# Patient Record
Sex: FEMALE | Race: BLACK OR AFRICAN AMERICAN | URBAN - METROPOLITAN AREA
[De-identification: names, ages, dates, MRNs, and addresses within clinical notes are randomized per-mention and may not be internally consistent; named-entity substitution may affect disease eponyms.]

---

## 2022-09-22 ENCOUNTER — WEB ENCOUNTER (OUTPATIENT)
Dept: URBAN - METROPOLITAN AREA CLINIC 105 | Facility: CLINIC | Age: 87
End: 2022-09-22

## 2022-09-22 ENCOUNTER — OFFICE VISIT (OUTPATIENT)
Dept: URBAN - METROPOLITAN AREA CLINIC 105 | Facility: CLINIC | Age: 87
End: 2022-09-22
Payer: COMMERCIAL

## 2022-09-22 VITALS
HEART RATE: 76 BPM | TEMPERATURE: 97.9 F | WEIGHT: 175 LBS | BODY MASS INDEX: 33.04 KG/M2 | DIASTOLIC BLOOD PRESSURE: 82 MMHG | HEIGHT: 61 IN | SYSTOLIC BLOOD PRESSURE: 133 MMHG

## 2022-09-22 DIAGNOSIS — R63.8 DIETARY INDISCRETION: ICD-10-CM

## 2022-09-22 DIAGNOSIS — K92.89 GAS BLOAT SYNDROME: ICD-10-CM

## 2022-09-22 PROCEDURE — 99204 OFFICE O/P NEW MOD 45 MIN: CPT | Performed by: INTERNAL MEDICINE

## 2022-09-22 PROCEDURE — 99244 OFF/OP CNSLTJ NEW/EST MOD 40: CPT | Performed by: INTERNAL MEDICINE

## 2022-09-22 RX ORDER — CETIRIZINE HYDROCHLORIDE 10 MG/1
TAKE ONE TABLET BY MOUTH ONE TIME DAILY AS NEEDED TABLET ORAL
Qty: 90 UNSPECIFIED | Refills: 1 | Status: ACTIVE | COMMUNITY

## 2022-09-22 RX ORDER — DOXYCYCLINE HYCLATE 100 MG/1
TABLET ORAL
Qty: 20 TABLET | Status: ON HOLD | COMMUNITY

## 2022-09-22 RX ORDER — DONEPEZIL HYDROCHLORIDE 10 MG/1
TAKE ONE-HALF TABLET (5MG TOTAL) BY MOUTH EVERY NIGHT TABLET, FILM COATED ORAL
Qty: 30 UNSPECIFIED | Refills: 0 | Status: ACTIVE | COMMUNITY

## 2022-09-22 RX ORDER — MUPIROCIN 20 MG/G
APPLY TO THE AFFECTED AREA(S) TOPICALLY TWICE DAILY FOR 7 DAYS OINTMENT TOPICAL
Qty: 22 UNSPECIFIED | Refills: 0 | Status: ACTIVE | COMMUNITY

## 2022-09-22 RX ORDER — ATORVASTATIN CALCIUM 80 MG/1
TABLET, FILM COATED ORAL
Qty: 90 TABLET | Status: ACTIVE | COMMUNITY

## 2022-09-22 RX ORDER — LOSARTAN POTASSIUM AND HYDROCHLOROTHIAZIDE 50; 12.5 MG/1; MG/1
TABLET, FILM COATED ORAL
Qty: 90 TABLET | Status: ACTIVE | COMMUNITY

## 2022-09-22 RX ORDER — DABIGATRAN ETEXILATE MESYLATE 150 MG/1
CAPSULE ORAL
Qty: 90 CAPSULE | Status: ACTIVE | COMMUNITY

## 2022-09-22 RX ORDER — LETROZOLE 2.5 MG/1
TAKE ONE TABLET BY MOUTH ONE TIME DAILY TABLET, FILM COATED ORAL
Qty: 90 UNSPECIFIED | Refills: 1 | Status: ACTIVE | COMMUNITY

## 2022-09-22 NOTE — HPI-TODAY'S VISIT:
The patient has a history of HTN, dementia and HPL who presents for evaluation of chronic diarrhea and belching who presents with c/o chronic diarrhea. The pt has her care taker and she also prepares her food. She denies melena, hematemesis or hematochezia.   The patient's consulttoin note will be sent to the referring MD, Dr. Antonella Quintanilla.

## 2022-09-22 NOTE — PHYSICAL EXAM GASTROINTESTINAL
Abdomen , soft, nontender, nondistended , no guarding or rigidity , no masses palpable , normal bowel sounds , Liver and Spleen , no hepatomegaly present , no hepatosplenomegaly , liver nontender , spleen not palpable
DC instructions

## 2022-09-23 ENCOUNTER — LAB OUTSIDE AN ENCOUNTER (OUTPATIENT)
Dept: URBAN - METROPOLITAN AREA CLINIC 105 | Facility: CLINIC | Age: 87
End: 2022-09-23

## 2022-09-29 LAB — GASTROINTESTINAL PATHOGEN: (no result)

## 2022-10-16 ENCOUNTER — WEB ENCOUNTER (OUTPATIENT)
Dept: URBAN - METROPOLITAN AREA CLINIC 17 | Facility: CLINIC | Age: 87
End: 2022-10-16

## 2022-10-20 ENCOUNTER — OFFICE VISIT (OUTPATIENT)
Dept: URBAN - METROPOLITAN AREA CLINIC 105 | Facility: CLINIC | Age: 87
End: 2022-10-20
Payer: COMMERCIAL

## 2022-10-20 VITALS
HEIGHT: 61 IN | HEART RATE: 70 BPM | BODY MASS INDEX: 33.99 KG/M2 | SYSTOLIC BLOOD PRESSURE: 145 MMHG | TEMPERATURE: 98.6 F | DIASTOLIC BLOOD PRESSURE: 79 MMHG | WEIGHT: 180 LBS

## 2022-10-20 DIAGNOSIS — R54 ADVANCED AGE: ICD-10-CM

## 2022-10-20 DIAGNOSIS — R63.8 DIETARY INDISCRETION: ICD-10-CM

## 2022-10-20 DIAGNOSIS — K52.9 CHRONIC DIARRHEA: ICD-10-CM

## 2022-10-20 DIAGNOSIS — E73.9 LACTOSE INTOLERANCE: ICD-10-CM

## 2022-10-20 PROBLEM — 782415009: Status: ACTIVE | Noted: 2022-10-20

## 2022-10-20 PROBLEM — 49808004: Status: ACTIVE | Noted: 2022-09-22

## 2022-10-20 PROCEDURE — 99214 OFFICE O/P EST MOD 30 MIN: CPT | Performed by: INTERNAL MEDICINE

## 2022-10-20 RX ORDER — ATORVASTATIN CALCIUM 80 MG/1
TABLET, FILM COATED ORAL
Qty: 90 TABLET | Status: ACTIVE | COMMUNITY

## 2022-10-20 RX ORDER — CETIRIZINE HYDROCHLORIDE 10 MG/1
TAKE ONE TABLET BY MOUTH ONE TIME DAILY AS NEEDED TABLET ORAL
Qty: 90 UNSPECIFIED | Refills: 1 | Status: ACTIVE | COMMUNITY

## 2022-10-20 RX ORDER — MUPIROCIN 20 MG/G
APPLY TO THE AFFECTED AREA(S) TOPICALLY TWICE DAILY FOR 7 DAYS OINTMENT TOPICAL
Qty: 22 UNSPECIFIED | Refills: 0 | Status: ACTIVE | COMMUNITY

## 2022-10-20 RX ORDER — DABIGATRAN ETEXILATE MESYLATE 150 MG/1
CAPSULE ORAL
Qty: 90 CAPSULE | Status: ACTIVE | COMMUNITY

## 2022-10-20 RX ORDER — LOSARTAN POTASSIUM AND HYDROCHLOROTHIAZIDE 50; 12.5 MG/1; MG/1
TABLET, FILM COATED ORAL
Qty: 90 TABLET | Status: ACTIVE | COMMUNITY

## 2022-10-20 RX ORDER — DONEPEZIL HYDROCHLORIDE 10 MG/1
TAKE ONE-HALF TABLET (5MG TOTAL) BY MOUTH EVERY NIGHT TABLET, FILM COATED ORAL
Qty: 30 UNSPECIFIED | Refills: 0 | Status: ACTIVE | COMMUNITY

## 2022-10-20 RX ORDER — DOXYCYCLINE HYCLATE 100 MG/1
TABLET ORAL
Qty: 20 TABLET | Status: ACTIVE | COMMUNITY

## 2022-10-20 RX ORDER — LETROZOLE 2.5 MG/1
TAKE ONE TABLET BY MOUTH ONE TIME DAILY TABLET, FILM COATED ORAL
Qty: 90 UNSPECIFIED | Refills: 1 | Status: ACTIVE | COMMUNITY

## 2022-10-20 NOTE — PHYSICAL EXAM MUSCULOSKELETAL:
normal gait and station , no tenderness or deformities present no dysuria, no frequency, and no hematuria.

## 2022-10-20 NOTE — HPI-TODAY'S VISIT:
The pt has a history of dementia, HPL and chronic diarrhea who presents for f/u office visit. Of note, pt submited  stool studies are negative. The pt is doing as she has modified her diet as well. The pth has made adjustment in avoiding caffeine. The pt is stable at this time and her weight is stable.

## 2023-02-24 NOTE — PHYSICAL EXAM SKIN:
Take antibiotics as prescribed  Finish entire course  Take eye drops as prescribed  Tylenol/ibuprofen for pain and fever  Warm compress for eye irritation   Wash hands frequently   Follow up with PCP in 3-5 days  Proceed to  ER if symptoms worsen  no rashes , no suspicious lesions , no areas of discoloration , no jaundice present , good turgor , no masses , no tenderness on palpation

## 2023-04-06 ENCOUNTER — OFFICE VISIT (OUTPATIENT)
Dept: URBAN - METROPOLITAN AREA CLINIC 105 | Facility: CLINIC | Age: 88
End: 2023-04-06

## 2023-09-29 ENCOUNTER — OFFICE VISIT (OUTPATIENT)
Dept: URBAN - METROPOLITAN AREA CLINIC 105 | Facility: CLINIC | Age: 88
End: 2023-09-29
Payer: COMMERCIAL

## 2023-09-29 ENCOUNTER — DASHBOARD ENCOUNTERS (OUTPATIENT)
Age: 88
End: 2023-09-29

## 2023-09-29 VITALS
HEART RATE: 83 BPM | SYSTOLIC BLOOD PRESSURE: 120 MMHG | HEIGHT: 61 IN | TEMPERATURE: 96.4 F | DIASTOLIC BLOOD PRESSURE: 77 MMHG

## 2023-09-29 DIAGNOSIS — D64.89 OTHER SPECIFIED ANEMIAS: ICD-10-CM

## 2023-09-29 DIAGNOSIS — K92.1 GASTROINTESTINAL HEMORRHAGE WITH MELENA: ICD-10-CM

## 2023-09-29 PROCEDURE — 99214 OFFICE O/P EST MOD 30 MIN: CPT

## 2023-09-29 RX ORDER — MUPIROCIN 20 MG/G
APPLY TO THE AFFECTED AREA(S) TOPICALLY TWICE DAILY FOR 7 DAYS OINTMENT TOPICAL
Qty: 22 UNSPECIFIED | Refills: 0 | Status: ACTIVE | COMMUNITY

## 2023-09-29 RX ORDER — DONEPEZIL HYDROCHLORIDE 10 MG/1
TAKE ONE-HALF TABLET (5MG TOTAL) BY MOUTH EVERY NIGHT TABLET, FILM COATED ORAL
Qty: 30 UNSPECIFIED | Refills: 0 | Status: ACTIVE | COMMUNITY

## 2023-09-29 RX ORDER — ATORVASTATIN CALCIUM 80 MG/1
TABLET, FILM COATED ORAL
Qty: 90 TABLET | Status: ACTIVE | COMMUNITY

## 2023-09-29 RX ORDER — DABIGATRAN ETEXILATE MESYLATE 150 MG/1
CAPSULE ORAL
Qty: 90 CAPSULE | Status: ACTIVE | COMMUNITY

## 2023-09-29 RX ORDER — LETROZOLE 2.5 MG/1
TAKE ONE TABLET BY MOUTH ONE TIME DAILY TABLET, FILM COATED ORAL
Qty: 90 UNSPECIFIED | Refills: 1 | Status: ACTIVE | COMMUNITY

## 2023-09-29 RX ORDER — PANTOPRAZOLE SODIUM 40 MG/1
1 TABLET TABLET, DELAYED RELEASE ORAL TWICE A DAY
Qty: 60 TABLET | Refills: 3 | OUTPATIENT
Start: 2023-09-29

## 2023-09-29 RX ORDER — CETIRIZINE HYDROCHLORIDE 10 MG/1
TAKE ONE TABLET BY MOUTH ONE TIME DAILY AS NEEDED TABLET ORAL
Qty: 90 UNSPECIFIED | Refills: 1 | Status: ACTIVE | COMMUNITY

## 2023-09-29 RX ORDER — LOSARTAN POTASSIUM AND HYDROCHLOROTHIAZIDE 50; 12.5 MG/1; MG/1
TABLET, FILM COATED ORAL
Qty: 90 TABLET | Status: ON HOLD | COMMUNITY

## 2023-09-29 RX ORDER — DOXYCYCLINE HYCLATE 100 MG/1
TABLET ORAL
Qty: 20 TABLET | Status: ACTIVE | COMMUNITY

## 2023-09-29 NOTE — HPI-TODAY'S VISIT:
Established patient of Dr. Cavazos who chronic history for loose stools present today for evaluation of melena.  She is accompanied by her caregiver who has her son on the phone. Went to [facility] on 09/13/2023 for upper GI bleed, black stools and dizziness/weakness. Patients HGB was 5.5 on admission CT was 17.5 MCV 90.2 MCHC 31.4 CTA abdomen/pelvis showed small hiatal hernia, distal jejunal and proximal ileal loops, demonstrating mild wall thickening suggestive of enteritis, sigmoid colonic diverticulosis suggesting mild, acute uncomplicated sigmoid diverticulitis. EGD showed normal esophagus, normal stomach, red blood found in second portion of duodenum, red blood found in jejunum, that was successfully injected with .1 mg/milliliter solution of epinephrine for hemostasis, along with two hemostatic clips that resulted in no bleeding at the end of the procedure. Patient was discharged with Pantoprazole 40 mg BID and iron supplements.  Recent labs showed  hgb 8.5 hct 28.2 on 09/20/2023  Today patient's reports more formed stools and less dark stools. No abominal pain, fever, chills. Admits to dypshagia with pills. No issue with foods or liquids.

## 2023-10-12 ENCOUNTER — OFFICE VISIT (OUTPATIENT)
Dept: URBAN - METROPOLITAN AREA CLINIC 105 | Facility: CLINIC | Age: 88
End: 2023-10-12

## 2024-09-03 ENCOUNTER — ERX REFILL RESPONSE (OUTPATIENT)
Dept: URBAN - METROPOLITAN AREA CLINIC 105 | Facility: CLINIC | Age: 89
End: 2024-09-03

## 2024-09-03 RX ORDER — PANTOPRAZOLE SODIUM 40 MG/1
TAKE ONE TABLET BY MOUTH TWICE A DAY TABLET, DELAYED RELEASE ORAL
Qty: 60 TABLET | Refills: 3 | OUTPATIENT